# Patient Record
Sex: FEMALE | Race: WHITE | Employment: PART TIME | URBAN - METROPOLITAN AREA
[De-identification: names, ages, dates, MRNs, and addresses within clinical notes are randomized per-mention and may not be internally consistent; named-entity substitution may affect disease eponyms.]

---
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
## 2019-01-24 ENCOUNTER — HOSPITAL ENCOUNTER (EMERGENCY)
Age: 19
Discharge: HOME | End: 2019-01-24
Attending: EMERGENCY MEDICINE | Admitting: EMERGENCY MEDICINE
Payer: COMMERCIAL

## 2019-01-24 ENCOUNTER — APPOINTMENT (OUTPATIENT)
Dept: GENERAL RADIOLOGY | Age: 19
End: 2019-01-24
Attending: EMERGENCY MEDICINE
Payer: COMMERCIAL

## 2019-01-24 VITALS
SYSTOLIC BLOOD PRESSURE: 90 MMHG | BODY MASS INDEX: 17.04 KG/M2 | DIASTOLIC BLOOD PRESSURE: 68 MMHG | HEART RATE: 55 BPM | TEMPERATURE: 98.2 F | OXYGEN SATURATION: 98 % | RESPIRATION RATE: 16 BRPM | HEIGHT: 66 IN | WEIGHT: 106 LBS

## 2019-01-24 DIAGNOSIS — M79.644 FINGER PAIN, RIGHT: Primary | ICD-10-CM

## 2019-01-24 PROCEDURE — 99282 EMERGENCY DEPT VISIT SF MDM: CPT | Performed by: EMERGENCY MEDICINE

## 2019-01-24 PROCEDURE — 73140 X-RAY EXAM OF FINGER(S): CPT

## 2019-01-24 NOTE — LETTER
[facility] [facility] EMERGENCY DEPT [address] [address] 
[phone] Work/School Note Date: 1/24/2019 To Whom It May concern: 
 
Colin Hurtado was seen and treated today in the emergency room by the following provider(s): 
Attending Provider: Gonzalo Good MD. Colin Hurtado may return to work on 1/25/19. Sincerely, 
 
 
 
[address]  [address]

## 2019-01-25 NOTE — ED NOTES
I have reviewed discharge instructions with the patient. The patient verbalized understanding. Patient left ED via Discharge Method: ambulatory to Home with self. Opportunity for questions and clarification provided. Patient given 0 scripts. To continue your aftercare when you leave the hospital, you may receive an automated call from our care team to check in on how you are doing. This is a free service and part of our promise to provide the best care and service to meet your aftercare needs.  If you have questions, or wish to unsubscribe from this service please call [phone]. Thank you for Choosing our [facility] Emergency Department.
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
## 2019-01-25 NOTE — DISCHARGE INSTRUCTIONS
As we discussed, follow up with your primary care provider for repeat evaluation in 3-5 days or return with any worsening pain or swelling or any further concerns.

## 2019-08-20 ENCOUNTER — HOSPITAL ENCOUNTER (EMERGENCY)
Dept: HOSPITAL 57 - BURERS | Age: 19
Discharge: HOME | End: 2019-08-20
Payer: SELF-PAY

## 2019-08-20 DIAGNOSIS — S50.811A: ICD-10-CM
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
## 2019-08-20 DIAGNOSIS — S60.221A: ICD-10-CM

## 2019-08-20 DIAGNOSIS — F17.210: ICD-10-CM

## 2019-08-20 DIAGNOSIS — S29.012A: Primary | ICD-10-CM

## 2019-08-20 DIAGNOSIS — S60.021A: ICD-10-CM

## 2019-08-20 PROCEDURE — 90471 IMMUNIZATION ADMIN: CPT

## 2019-08-20 PROCEDURE — 71046 X-RAY EXAM CHEST 2 VIEWS: CPT

## 2019-08-20 PROCEDURE — 90715 TDAP VACCINE 7 YRS/> IM: CPT

## 2019-08-20 NOTE — RAD
CHEST 2 VIEWS:

 

DATE: 8/20/2019.

 

FINDINGS: 

The heart is normal in size.  There is no mediastinal widening or shift.  The lungs are fully inflate
d and clear.  No rib fractures were seen.

 

IMPRESSION: 

No acute thoracic findings.

 

POS: HOME

## 2019-08-20 NOTE — RAD
RIGHT HAND 3 VIEWS:

 

DATE: 8/20/2019.

 

FINDINGS: 

No fracture was seen.  All bones appear intact.  The carpal relationships appear normal.

 

IMPRESSION: 

No acute findings.

 

POS: HOME

## 2020-03-08 ENCOUNTER — HOSPITAL ENCOUNTER (EMERGENCY)
Age: 20
Discharge: STILL ADMITTED OR ADMINISTRATIVE | End: 2020-03-08

## 2020-09-17 ENCOUNTER — HOSPITAL ENCOUNTER (EMERGENCY)
Age: 20
Discharge: HOME | End: 2020-09-17
Payer: COMMERCIAL

## 2020-09-17 VITALS
BODY MASS INDEX: 17.68 KG/M2 | HEART RATE: 68 BPM | DIASTOLIC BLOOD PRESSURE: 63 MMHG | RESPIRATION RATE: 16 BRPM | OXYGEN SATURATION: 98 % | WEIGHT: 110 LBS | TEMPERATURE: 97 F | HEIGHT: 66 IN | SYSTOLIC BLOOD PRESSURE: 102 MMHG

## 2020-09-17 DIAGNOSIS — L02.91 ABSCESS: ICD-10-CM

## 2020-09-17 DIAGNOSIS — L72.3 SEBACEOUS CYST: Primary | ICD-10-CM

## 2020-09-17 PROCEDURE — 99283 EMERGENCY DEPT VISIT LOW MDM: CPT

## 2020-09-17 PROCEDURE — 75810000289 HC I&D ABSCESS SIMP/COMP/MULT

## 2020-09-17 RX ORDER — CEPHALEXIN 500 MG/1
500 CAPSULE ORAL 4 TIMES DAILY
Qty: 28 CAP | Refills: 0 | Status: SHIPPED | OUTPATIENT
Start: 2020-09-17 | End: 2020-09-24

## 2020-09-17 RX ORDER — SULFAMETHOXAZOLE AND TRIMETHOPRIM 800; 160 MG/1; MG/1
1 TABLET ORAL 2 TIMES DAILY
Qty: 14 TAB | Refills: 0 | Status: SHIPPED | OUTPATIENT
Start: 2020-09-17 | End: 2020-09-24

## 2020-09-17 NOTE — DISCHARGE INSTRUCTIONS

## 2020-09-17 NOTE — ED NOTES
I have reviewed discharge instructions with the patient. The patient verbalized understanding. Patient left ED via Discharge Method: ambulatory to Home with her boyfriend. Opportunity for questions and clarification provided. Patient given 2 scripts. To continue your aftercare when you leave the hospital, you may receive an automated call from our care team to check in on how you are doing.  This is a free service and part of our promise to provide the best care and service to meet your aftercare needs. \" If you have questions, or wish to unsubscribe from this service please call [phone].  Thank you for Choosing our [facility] Emergency Department.

## 2020-09-17 NOTE — ED PROVIDER NOTES
50-year-old female abscess to right side trying to express pus for several days without success. Past Medical History:   Diagnosis Date    Anxiety     Bipolar 1 disorder ([facility] [address]     Depression        No past surgical history on file. No family history on file. Social History     Socioeconomic History    Marital status: SINGLE     Spouse name: Not on file    Number of children: Not on file    Years of education: Not on file    Highest education level: Not on file   Occupational History    Not on file   Social Needs    Financial resource strain: Not on file    Food insecurity     Worry: Not on file     Inability: Not on file    Transportation needs     Medical: Not on file     Non-medical: Not on file   Tobacco Use    Smoking status: Current Every Day Smoker     Packs/day: 0.25    Smokeless tobacco: Never Used   Substance and Sexual Activity    Alcohol use: No    Drug use: No    Sexual activity: Not Currently   Lifestyle    Physical activity     Days per week: Not on file     Minutes per session: Not on file    Stress: Not on file   Relationships    Social connections     Talks on phone: Not on file     Gets together: Not on file     Attends Hoahaoism service: Not on file     Active member of club or organization: Not on file     Attends meetings of clubs or organizations: Not on file     Relationship status: Not on file    Intimate partner violence     Fear of current or ex partner: Not on file     Emotionally abused: Not on file     Physically abused: Not on file     Forced sexual activity: Not on file   Other Topics Concern    Not on file   Social History Narrative    Not on file         ALLERGIES: Patient has no known allergies. Review of Systems   Constitutional: Negative. Negative for activity change. HENT: Negative. Eyes: Negative. Respiratory: Negative. Cardiovascular: Negative. Gastrointestinal: Negative. Genitourinary: Negative.     Musculoskeletal: Negative. Skin: Negative. Neurological: Negative. Psychiatric/Behavioral: Negative. All other systems reviewed and are negative. Vitals:    09/17/20 0118   BP: 104/68   Pulse: 95   Resp: 16   Temp: 97 °F (36.1 °C)   SpO2: 98%   Weight: 49.9 kg (110 lb)   Height: 5' 6\" (1.676 m)            Physical Exam  Vitals signs and nursing note reviewed. Constitutional:       General: She is not in acute distress. Appearance: She is well-developed. She is not diaphoretic. HENT:      Head: Normocephalic and atraumatic. Right Ear: External ear normal.      Left Ear: External ear normal.      Nose: Nose normal.      Mouth/Throat:      Pharynx: No oropharyngeal exudate. Eyes:      General: No scleral icterus. Right eye: No discharge. Left eye: No discharge. Conjunctiva/sclera: Conjunctivae normal.      Pupils: Pupils are equal, round, and reactive to light. Neck:      Musculoskeletal: Normal range of motion and neck supple. Vascular: No JVD. Trachea: No tracheal deviation. Cardiovascular:      Rate and Rhythm: Normal rate. Pulmonary:      Effort: Pulmonary effort is normal. No respiratory distress. Breath sounds: No stridor. No wheezing. Chest:      Chest wall: No tenderness. Abdominal:      General: Bowel sounds are normal. There is no distension. Palpations: Abdomen is soft. There is no mass. Tenderness: There is no abdominal tenderness. Musculoskeletal: Normal range of motion. General: No tenderness. Skin:     General: Skin is warm and dry. Coloration: Skin is not pale. Findings: Abscess present. No erythema or rash. Neurological:      Mental Status: She is alert and oriented to person, place, and time. Cranial Nerves: No cranial nerve deficit. Psychiatric:         Behavior: Behavior normal.         Thought Content:  Thought content normal.          MDM  Number of Diagnoses or Management Options  Abscess: minor  Sebaceous cyst: minor         I&D Abcess Simple    Date/Time: 9/17/2020 2:11 AM  Performed by: Du Cunningham MD  Authorized by: Du Cunningham MD     Consent:     Consent obtained:  Verbal    Consent given by:  Patient    Risks discussed:  Bleeding and damage to other organs    Alternatives discussed:  No treatment  Location:     Type:  Abscess    Location:  Trunk  Pre-procedure details:     Skin preparation:  Betadine  Anesthesia (see MAR for exact dosages): Anesthesia method:  Local infiltration    Local anesthetic:  Lidocaine 1% w/o epi  Procedure type:     Complexity:  Simple  Procedure details:     Needle aspiration: no      Incision types:  Stab incision    Scalpel blade:  11    Wound management:  Probed and deloculated    Drainage:  Purulent and bloody    Drainage amount:  Scant    Packing materials:  None  Post-procedure details:     Patient tolerance of procedure:   Tolerated well, no immediate complications

## 2020-09-17 NOTE — ED TRIAGE NOTES
Patient states she has a bump on her side. States that she has been trying to pop it. States it has been there for 4-5 days.

## 2021-02-04 ENCOUNTER — APPOINTMENT (OUTPATIENT)
Dept: ULTRASOUND IMAGING | Age: 21
End: 2021-02-04
Payer: COMMERCIAL

## 2021-02-04 ENCOUNTER — APPOINTMENT (OUTPATIENT)
Dept: CT IMAGING | Age: 21
End: 2021-02-04
Payer: COMMERCIAL

## 2021-02-04 ENCOUNTER — HOSPITAL ENCOUNTER (EMERGENCY)
Age: 21
Discharge: HOME | End: 2021-02-04
Payer: COMMERCIAL

## 2021-02-04 VITALS
OXYGEN SATURATION: 98 % | SYSTOLIC BLOOD PRESSURE: 106 MMHG | WEIGHT: 126 LBS | RESPIRATION RATE: 16 BRPM | DIASTOLIC BLOOD PRESSURE: 66 MMHG | BODY MASS INDEX: 20.25 KG/M2 | HEIGHT: 66 IN | HEART RATE: 59 BPM | TEMPERATURE: 97.7 F

## 2021-02-04 DIAGNOSIS — N20.1 CALCULUS OF URETER: Primary | ICD-10-CM

## 2021-02-04 LAB
ALBUMIN SERPL-MCNC: 3.8 G/DL (ref 3.5–5)
ALBUMIN/GLOB SERPL: 1.2 {RATIO} (ref 1.2–3.5)
ALP SERPL-CCNC: 64 U/L (ref 50–136)
ALT SERPL-CCNC: 27 U/L (ref 12–65)
ANION GAP SERPL CALC-SCNC: 6 MMOL/L (ref 7–16)
AST SERPL-CCNC: 18 U/L (ref 15–37)
BASOPHILS # BLD: 0.1 K/UL (ref 0–0.2)
BASOPHILS NFR BLD: 1 % (ref 0–2)
BILIRUB SERPL-MCNC: 0.3 MG/DL (ref 0.2–1.1)
BUN SERPL-MCNC: 18 MG/DL (ref 6–23)
CALCIUM SERPL-MCNC: 9.1 MG/DL (ref 8.3–10.4)
CHLORIDE SERPL-SCNC: 110 MMOL/L (ref 98–107)
CO2 SERPL-SCNC: 28 MMOL/L (ref 21–32)
CREAT SERPL-MCNC: 0.73 MG/DL (ref 0.6–1)
DIFFERENTIAL METHOD BLD: ABNORMAL
EOSINOPHIL # BLD: 0.2 K/UL (ref 0–0.8)
EOSINOPHIL NFR BLD: 3 % (ref 0.5–7.8)
ERYTHROCYTE [DISTWIDTH] IN BLOOD BY AUTOMATED COUNT: 12.4 % (ref 11.9–14.6)
GLOBULIN SER CALC-MCNC: 3.3 G/DL (ref 2.3–3.5)
GLUCOSE SERPL-MCNC: 107 MG/DL (ref 65–100)
HCT VFR BLD AUTO: 42.1 % (ref 35.8–46.3)
HGB BLD-MCNC: 14 G/DL (ref 11.7–15.4)
IMM GRANULOCYTES # BLD AUTO: 0 K/UL (ref 0–0.5)
IMM GRANULOCYTES NFR BLD AUTO: 0 % (ref 0–5)
LIPASE SERPL-CCNC: 152 U/L (ref 73–393)
LYMPHOCYTES # BLD: 4.1 K/UL (ref 0.5–4.6)
LYMPHOCYTES NFR BLD: 48 % (ref 13–44)
MCH RBC QN AUTO: 33.1 PG (ref 26.1–32.9)
MCHC RBC AUTO-ENTMCNC: 33.3 G/DL (ref 31.4–35)
MCV RBC AUTO: 99.5 FL (ref 79.6–97.8)
MONOCYTES # BLD: 0.5 K/UL (ref 0.1–1.3)
MONOCYTES NFR BLD: 6 % (ref 4–12)
NEUTS SEG # BLD: 3.6 K/UL (ref 1.7–8.2)
NEUTS SEG NFR BLD: 42 % (ref 43–78)
NRBC # BLD: 0 K/UL (ref 0–0.2)
PLATELET # BLD AUTO: 152 K/UL (ref 150–450)
PMV BLD AUTO: 13.5 FL (ref 9.4–12.3)
POTASSIUM SERPL-SCNC: 3.6 MMOL/L (ref 3.5–5.1)
PROT SERPL-MCNC: 7.1 G/DL (ref 6.3–8.2)
RBC # BLD AUTO: 4.23 M/UL (ref 4.05–5.2)
SODIUM SERPL-SCNC: 144 MMOL/L (ref 136–145)
WBC # BLD AUTO: 8.6 K/UL (ref 4.3–11.1)

## 2021-02-04 PROCEDURE — 96374 THER/PROPH/DIAG INJ IV PUSH: CPT

## 2021-02-04 PROCEDURE — 99283 EMERGENCY DEPT VISIT LOW MDM: CPT

## 2021-02-04 PROCEDURE — 80053 COMPREHEN METABOLIC PANEL: CPT

## 2021-02-04 PROCEDURE — 76770 US EXAM ABDO BACK WALL COMP: CPT

## 2021-02-04 PROCEDURE — 83690 ASSAY OF LIPASE: CPT

## 2021-02-04 PROCEDURE — 96375 TX/PRO/DX INJ NEW DRUG ADDON: CPT

## 2021-02-04 PROCEDURE — 85025 COMPLETE CBC W/AUTO DIFF WBC: CPT

## 2021-02-04 PROCEDURE — 74011250636 HC RX REV CODE- 250/636

## 2021-02-04 PROCEDURE — 74176 CT ABD & PELVIS W/O CONTRAST: CPT

## 2021-02-04 RX ORDER — SODIUM CHLORIDE 9 MG/ML
1000 INJECTION, SOLUTION INTRAVENOUS ONCE
Status: COMPLETED | OUTPATIENT
Start: 2021-02-04 | End: 2021-02-04

## 2021-02-04 RX ORDER — HYDROMORPHONE HYDROCHLORIDE 1 MG/ML
1 INJECTION, SOLUTION INTRAMUSCULAR; INTRAVENOUS; SUBCUTANEOUS ONCE
Status: COMPLETED | OUTPATIENT
Start: 2021-02-04 | End: 2021-02-04

## 2021-02-04 RX ORDER — MORPHINE SULFATE 4 MG/ML
4 INJECTION INTRAVENOUS
Status: COMPLETED | OUTPATIENT
Start: 2021-02-04 | End: 2021-02-04

## 2021-02-04 RX ORDER — KETOROLAC TROMETHAMINE 30 MG/ML
30 INJECTION, SOLUTION INTRAMUSCULAR; INTRAVENOUS
Status: COMPLETED | OUTPATIENT
Start: 2021-02-04 | End: 2021-02-04

## 2021-02-04 RX ORDER — HYDROCODONE BITARTRATE AND ACETAMINOPHEN 7.5; 325 MG/1; MG/1
1 TABLET ORAL 2 TIMES DAILY
Qty: 10 TAB | Refills: 0 | Status: SHIPPED | OUTPATIENT
Start: 2021-02-04 | End: 2021-02-07

## 2021-02-04 RX ORDER — ONDANSETRON 2 MG/ML
4 INJECTION INTRAMUSCULAR; INTRAVENOUS
Status: COMPLETED | OUTPATIENT
Start: 2021-02-04 | End: 2021-02-04

## 2021-02-04 RX ORDER — HYDROCODONE BITARTRATE AND ACETAMINOPHEN 7.5; 325 MG/1; MG/1
1 TABLET ORAL 2 TIMES DAILY
Qty: 60 TAB | Refills: 0 | Status: SHIPPED | OUTPATIENT
Start: 2021-02-04 | End: 2021-02-04 | Stop reason: SDUPTHER

## 2021-02-04 RX ORDER — TAMSULOSIN HYDROCHLORIDE 0.4 MG/1
0.4 CAPSULE ORAL DAILY
Qty: 15 CAP | Refills: 0 | Status: SHIPPED | OUTPATIENT
Start: 2021-02-04 | End: 2021-02-10 | Stop reason: SDUPTHER

## 2021-02-04 RX ADMIN — SODIUM CHLORIDE 1000 ML: 900 INJECTION, SOLUTION INTRAVENOUS at 08:39

## 2021-02-04 RX ADMIN — ONDANSETRON 4 MG: 2 INJECTION INTRAMUSCULAR; INTRAVENOUS at 08:39

## 2021-02-04 RX ADMIN — MORPHINE SULFATE 4 MG: 4 INJECTION INTRAVENOUS at 08:39

## 2021-02-04 RX ADMIN — KETOROLAC TROMETHAMINE 30 MG: 30 INJECTION, SOLUTION INTRAMUSCULAR at 08:39

## 2021-02-04 RX ADMIN — HYDROMORPHONE HYDROCHLORIDE 1 MG: 1 INJECTION, SOLUTION INTRAMUSCULAR; INTRAVENOUS; SUBCUTANEOUS at 10:38

## 2021-02-04 NOTE — LETTER
[facility] EMERGENCY DEPT 
[address] [facility] [place] [facility] 
[phone] Work/School Note Date: 2/4/2021 To Whom It May concern: 
 
 
Shon Leon was seen and treated today in the emergency room by the following provider(s): 
Attending Provider: Romi Guzman MD. Shon Leon is excused from work/school on 02/04/21. She is clear to return to work/school on 02/05/21. Sincerely, 
 
 
Scout Gibbons y

## 2021-02-04 NOTE — ED PROVIDER NOTES
70-year-old female complaint of left flank and abdominal pain. Patient is very dramatic in triage having severe pain. Flank Pain   This is a new problem. The current episode started 6 to 12 hours ago. The problem has not changed since onset. The problem occurs constantly. The pain is associated with no known injury. The quality of the pain is described as stabbing. The pain is at a severity of 10/10. Associated symptoms include abdominal pain. Pertinent negatives include no chest pain, no fever, no bladder incontinence, no dysuria, no pelvic pain, no paresthesias, no paresis and no tingling. She has tried nothing for the symptoms. Past Medical History:   Diagnosis Date    Anxiety     Bipolar 1 disorder ([facility] [place] [address]     Depression        No past surgical history on file. History reviewed. No pertinent family history.     Social History     Socioeconomic History    Marital status: SINGLE     Spouse name: Not on file    Number of children: Not on file    Years of education: Not on file    Highest education level: Not on file   Occupational History    Not on file   Social Needs    Financial resource strain: Not on file    Food insecurity     Worry: Not on file     Inability: Not on file    Transportation needs     Medical: Not on file     Non-medical: Not on file   Tobacco Use    Smoking status: Current Every Day Smoker     Packs/day: 0.25    Smokeless tobacco: Never Used   Substance and Sexual Activity    Alcohol use: No    Drug use: No    Sexual activity: Not Currently   Lifestyle    Physical activity     Days per week: Not on file     Minutes per session: Not on file    Stress: Not on file   Relationships    Social connections     Talks on phone: Not on file     Gets together: Not on file     Attends Church service: Not on file     Active member of club or organization: Not on file     Attends meetings of clubs or organizations: Not on file     Relationship status: Not on file   [facility] Intimate partner violence     Fear of current or ex partner: Not on file     Emotionally abused: Not on file     Physically abused: Not on file     Forced sexual activity: Not on file   Other Topics Concern    Not on file   Social History Narrative    Not on file         ALLERGIES: Patient has no known allergies. Review of Systems   Constitutional: Negative. Negative for activity change and fever. HENT: Negative. Eyes: Negative. Respiratory: Negative. Cardiovascular: Negative. Negative for chest pain. Gastrointestinal: Positive for abdominal pain. Genitourinary: Positive for flank pain. Negative for bladder incontinence, dysuria and pelvic pain. Skin: Negative. Neurological: Negative. Negative for tingling and paresthesias. Psychiatric/Behavioral: Negative. All other systems reviewed and are negative. Vitals:    02/04/21 0828 02/04/21 1035   BP: 131/62 129/64   Pulse: (!) 59 64   Resp: 16    Temp: 97.7 °F (36.5 °C)    SpO2: 100% 100%   Weight: 57.2 kg (126 lb)    Height: 5' 6\" (1.676 m)             Physical Exam  Vitals signs and nursing note reviewed. Constitutional:       General: She is not in acute distress. Appearance: Normal appearance. She is well-developed. She is not ill-appearing, toxic-appearing or diaphoretic. HENT:      Head: Normocephalic and atraumatic. No right periorbital erythema or left periorbital erythema. Jaw: There is normal jaw occlusion. Salivary Glands: Right salivary gland is not diffusely enlarged. Left salivary gland is not diffusely enlarged. Right Ear: External ear normal.      Left Ear: External ear normal.      Nose: Nose normal. No congestion or rhinorrhea. Mouth/Throat:      Mouth: Mucous membranes are moist.      Pharynx: No oropharyngeal exudate or posterior oropharyngeal erythema. Eyes:      General: Lids are normal. No scleral icterus. Right eye: No discharge. Left eye: No discharge. Extraocular Movements: Extraocular movements intact. Conjunctiva/sclera: Conjunctivae normal.      Right eye: Right conjunctiva is not injected. Left eye: Left conjunctiva is not injected. Pupils: Pupils are equal, round, and reactive to light. Neck:      Musculoskeletal: Full passive range of motion without pain, normal range of motion and neck supple. Normal range of motion. No erythema, neck rigidity, injury, pain with movement or muscular tenderness. Thyroid: No thyroid mass. Vascular: No JVD. Trachea: Trachea and phonation normal.   Cardiovascular:      Rate and Rhythm: Normal rate and regular rhythm. Pulses: Normal pulses. Heart sounds: Normal heart sounds. Heart sounds not distant. No murmur. No friction rub. No gallop. Pulmonary:      Effort: Pulmonary effort is normal. No tachypnea, accessory muscle usage, respiratory distress or retractions. Breath sounds: No stridor. No decreased breath sounds, wheezing, rhonchi or rales. Chest:      Chest wall: No tenderness. Abdominal:      General: Abdomen is flat. Bowel sounds are normal. There is no distension. There are no signs of injury. Palpations: Abdomen is soft. There is no fluid wave, mass or pulsatile mass. Tenderness: There is no abdominal tenderness. There is no guarding or rebound. Musculoskeletal: Normal range of motion. General: No swelling, tenderness, deformity or signs of injury. Right lower leg: No edema. Left lower leg: No edema. Lymphadenopathy:      Cervical: No cervical adenopathy. Skin:     General: Skin is warm and dry. Capillary Refill: Capillary refill takes less than 2 seconds. Coloration: Skin is not jaundiced or pale. Findings: No bruising, erythema or rash. Neurological:      General: No focal deficit present. Mental Status: She is alert and oriented to person, place, and time. Mental status is at baseline.       GCS: GCS eye subscore is 4. GCS verbal subscore is 5. GCS motor subscore is 6. Cranial Nerves: No dysarthria or facial asymmetry. Sensory: No sensory deficit. Motor: No weakness or tremor. Coordination: Coordination normal.   Psychiatric:         Mood and Affect: Mood normal.         Behavior: Behavior normal. Behavior is cooperative. Thought Content: Thought content normal.         Judgment: Judgment normal.          MDM  Number of Diagnoses or Management Options  Calculus of ureter  Diagnosis management comments: Patient is demonstrating a lot of pain complaints requiring multiple doses of narcotic pain medicine this is her first stone so the pain is somewhat surprising to her. We will give her pain medicine today Flomax and have her follow-up with urology she has a 2 mm obstructing stone.        Amount and/or Complexity of Data Reviewed  Clinical lab tests: ordered and reviewed  Tests in the radiology section of CPT®: ordered and reviewed  Tests in the medicine section of CPT®: ordered and reviewed  Decide to obtain previous medical records or to obtain history from someone other than the patient: yes  Review and summarize past medical records: yes  Independent visualization of images, tracings, or specimens: yes    Risk of Complications, Morbidity, and/or Mortality  Presenting problems: high  Diagnostic procedures: high  Management options: high    Patient Progress  Patient progress: stable         Procedures

## 2021-02-04 NOTE — ED TRIAGE NOTES
Pt arrives ambulatory to triage. Reports she took her tampon out today and started having excruciating pain in left flank area that radiates to the left side of abdomen. States nausea and vomiting with this. in obvious pain in triage. Masked.

## 2021-02-04 NOTE — ED NOTES
US called this RN to make aware that Bladder was not full enough for pelvic exam and then pt had increased pain and was not able to tolerate imaging. MD aware.  Pt brought back

## 2021-02-04 NOTE — ED NOTES
I have reviewed discharge instructions with the patient. The patient verbalized understanding. Patient left ED via Discharge Method: ambulatory to Home with family    Opportunity for questions and clarification provided. Patient given 2 scripts. To continue your aftercare when you leave the hospital, you may receive an automated call from our care team to check in on how you are doing. This is a free service and part of our promise to provide the best care and service to meet your aftercare needs.  If you have questions, or wish to unsubscribe from this service please call [phone]. Thank you for Choosing our [facility] Emergency Department.

## 2023-01-01 ENCOUNTER — HOSPITAL ENCOUNTER (EMERGENCY)
Age: 23
Discharge: HOME | End: 2023-01-01
Attending: EMERGENCY MEDICINE
Payer: MEDICAID

## 2023-01-01 VITALS
TEMPERATURE: 98.6 F | BODY MASS INDEX: 21.19 KG/M2 | OXYGEN SATURATION: 98 % | DIASTOLIC BLOOD PRESSURE: 60 MMHG | SYSTOLIC BLOOD PRESSURE: 101 MMHG | HEIGHT: 67 IN | HEART RATE: 72 BPM | RESPIRATION RATE: 17 BRPM | WEIGHT: 135 LBS

## 2023-01-01 DIAGNOSIS — J02.9 ACUTE PHARYNGITIS, UNSPECIFIED ETIOLOGY: Primary | ICD-10-CM

## 2023-01-01 LAB — STREP, MOLECULAR: NOT DETECTED

## 2023-01-01 PROCEDURE — 6370000000 HC RX 637 (ALT 250 FOR IP): Performed by: STUDENT IN AN ORGANIZED HEALTH CARE EDUCATION/TRAINING PROGRAM

## 2023-01-01 PROCEDURE — 96372 THER/PROPH/DIAG INJ SC/IM: CPT

## 2023-01-01 PROCEDURE — 6360000002 HC RX W HCPCS: Performed by: STUDENT IN AN ORGANIZED HEALTH CARE EDUCATION/TRAINING PROGRAM

## 2023-01-01 PROCEDURE — 99284 EMERGENCY DEPT VISIT MOD MDM: CPT

## 2023-01-01 PROCEDURE — 87651 STREP A DNA AMP PROBE: CPT

## 2023-01-01 RX ORDER — LIDOCAINE HYDROCHLORIDE 20 MG/ML
15 SOLUTION OROPHARYNGEAL
Status: COMPLETED | OUTPATIENT
Start: 2023-01-01 | End: 2023-01-01

## 2023-01-01 RX ORDER — DEXAMETHASONE SODIUM PHOSPHATE 10 MG/ML
10 INJECTION, SOLUTION INTRAMUSCULAR; INTRAVENOUS ONCE
Status: COMPLETED | OUTPATIENT
Start: 2023-01-01 | End: 2023-01-01

## 2023-01-01 RX ORDER — AMOXICILLIN 500 MG/1
500 CAPSULE ORAL 2 TIMES DAILY
Qty: 20 CAPSULE | Refills: 0 | Status: SHIPPED | OUTPATIENT
Start: 2023-01-01 | End: 2023-01-11

## 2023-01-01 RX ORDER — LIDOCAINE HYDROCHLORIDE 20 MG/ML
15 SOLUTION OROPHARYNGEAL
Qty: 100 ML | Refills: 0 | Status: SHIPPED | OUTPATIENT
Start: 2023-01-01 | End: 2023-01-08

## 2023-01-01 RX ORDER — PREDNISONE 50 MG/1
50 TABLET ORAL DAILY
Qty: 5 TABLET | Refills: 0 | Status: SHIPPED | OUTPATIENT
Start: 2023-01-01 | End: 2023-01-06

## 2023-01-01 RX ADMIN — LIDOCAINE HYDROCHLORIDE 15 ML: 20 SOLUTION ORAL; TOPICAL at 21:44

## 2023-01-01 RX ADMIN — DEXAMETHASONE SODIUM PHOSPHATE 10 MG: 10 INJECTION INTRAMUSCULAR; INTRAVENOUS at 21:44

## 2023-01-01 ASSESSMENT — PAIN DESCRIPTION - LOCATION
LOCATION: EAR;THROAT
LOCATION: THROAT

## 2023-01-01 ASSESSMENT — ENCOUNTER SYMPTOMS
VOICE CHANGE: 0
SHORTNESS OF BREATH: 0
WHEEZING: 0
VOMITING: 0
RHINORRHEA: 0
SORE THROAT: 1
FACIAL SWELLING: 0
EYE PAIN: 0
COUGH: 0
EYE REDNESS: 0
EYE DISCHARGE: 0
DIARRHEA: 0
APNEA: 0
ABDOMINAL PAIN: 0
TROUBLE SWALLOWING: 0
CHEST TIGHTNESS: 0
PHOTOPHOBIA: 0

## 2023-01-01 ASSESSMENT — PAIN DESCRIPTION - DESCRIPTORS: DESCRIPTORS: ACHING;SHARP

## 2023-01-01 ASSESSMENT — PAIN SCALES - GENERAL
PAINLEVEL_OUTOF10: 8
PAINLEVEL_OUTOF10: 4

## 2023-01-01 ASSESSMENT — PAIN - FUNCTIONAL ASSESSMENT
PAIN_FUNCTIONAL_ASSESSMENT: 0-10
PAIN_FUNCTIONAL_ASSESSMENT: ACTIVITIES ARE NOT PREVENTED

## 2023-01-01 ASSESSMENT — LIFESTYLE VARIABLES
HOW MANY STANDARD DRINKS CONTAINING ALCOHOL DO YOU HAVE ON A TYPICAL DAY: 1 OR 2
HOW OFTEN DO YOU HAVE A DRINK CONTAINING ALCOHOL: MONTHLY OR LESS

## 2023-01-01 NOTE — Clinical Note
Komal Reyes was seen and treated in our emergency department on 1/1/2023. She may return to work on 01/03/2023. If you have any questions or concerns, please don't hesitate to call.       Nj Pino

## 2023-01-02 NOTE — ED PROVIDER NOTES
Emergency Department Provider Note                   PCP:                None Provider               Age: 20 y.o. Sex: female       ICD-10-CM    1. Acute pharyngitis, unspecified etiology  J02.9           DISPOSITION Discharge - Pending Orders Complete 01/01/2023 09:28:37 PM        MDM  Number of Diagnoses or Management Options  Acute pharyngitis, unspecified etiology  Diagnosis management comments: In summary this is a 79-year-old female patient presenting with symptoms of acute pharyngitis. Based on my evaluation I feel the patient is at low risk for alternative causes such as peritonsillar abscess, retropharyngeal abscess, epiglottitis, Ludwigs angina. The reasoning behind my decision process is that the patient is overall well-appearing in the exam room, not tripoding, and in no acute distress. Vital signs are stable without evidence of hypoxia, tachycardia, tachypnea, hypotension. Slightly low normal blood pressure adequately explained by patient's small frame. The patient does not exhibit any muffled voice, uvular deviation, trismus. The patient has good dentition, is not toxic appearing, and is not drooling. The patient is speaking in full sentences without increased work of breathing. There is no evidence of submandibular swelling or edema on the floor of the mouth. Lungs are clear to auscultation and the patient does not exhibit any stridor. Rapid strep is negative. However, given patient's 7-day course of symptoms, we will treat empirically for other bacterial infection. The plan for this patient is outpatient management. We'll treat the patient with oral antibiotics. The patient was instructed of signs and symptoms to monitor for and instructed to encourage supportive care. The follow up for this patient will be on an as needed basis if symptoms worsen, persist, change.  I have specifically counseled the patient on warning signs to return immediately for including but not limited to Please renew if applicable.     Anum Aquino LPN............. December 19, 2019 11:14 AM      difficulty swallowing, respiratory difficulty. The patient has verbalized understanding and is in agreement with the treatment plan. Amount and/or Complexity of Data Reviewed  Clinical lab tests: ordered and reviewed    Risk of Complications, Morbidity, and/or Mortality  Presenting problems: low  Diagnostic procedures: low  Management options: low       Complexity of Problem: 1 stable, acute illness. (3)  The patients assessment required an independent historian: I spoke with a parent. I have conducted an independent ordering and review of Labs. Considerations: Shared decision making was utilized in the care of this patient. Social determinant affecting care: inability to afford medications  Evidence based risk calculation performed: Centor criteria  We discussed care recommended by provider that patient declined (tests, disposition, etc). Patient was discharged risks and benefits of hospitalization were discussed. Orders Placed This Encounter   Procedures    Group A Strep Screen By PCR        Medications   dexamethasone (PF) (DECADRON) injection 10 mg (has no administration in time range)   lidocaine viscous hcl (XYLOCAINE) 2 % solution 15 mL (has no administration in time range)       New Prescriptions    AMOXICILLIN (AMOXIL) 500 MG CAPSULE    Take 1 capsule by mouth 2 times daily for 10 days    LIDOCAINE VISCOUS HCL (XYLOCAINE) 2 % SOLN SOLUTION    Take 15 mLs by mouth every 3 hours as needed for Irritation (sore throat)    PREDNISONE (DELTASONE) 50 MG TABLET    Take 1 tablet by mouth daily for 5 days        Julien Sterling is a 20 y.o. female who presents to the Emergency Department with chief complaint of    Chief Complaint   Patient presents with    Pharyngitis    Otalgia      23-year-old female patient without significant reported past medical history presents today complaining of sore throat for the past 1 week. Reports symptoms as constant and moderate in severity.   Reports symptoms are exacerbated by swallowing. Better with Tylenol. Reports she feels like due to the pain in her throat she is getting some pain in her ears bilaterally as well. Denies fevers, trismus, drooling, voice changes, neck stiffness or swelling, difficulty swallowing. Denies chest pain or shortness of breath. Denies congestion, headaches. Denies ear swelling or mastoid tenderness. Patient is primary historian and quality is reliable. The history is provided by the patient. No  was used. Review of Systems   Constitutional:  Negative for fatigue and fever. HENT:  Positive for ear pain and sore throat. Negative for congestion, facial swelling, hearing loss, rhinorrhea, trouble swallowing and voice change. Eyes:  Negative for photophobia, pain, discharge, redness and visual disturbance. Respiratory:  Negative for apnea, cough, chest tightness, shortness of breath and wheezing. Cardiovascular:  Negative for chest pain and palpitations. Gastrointestinal:  Negative for abdominal pain, diarrhea and vomiting. Endocrine: Negative for polydipsia, polyphagia and polyuria. Genitourinary:  Negative for decreased urine volume, dysuria, flank pain, frequency and hematuria. Musculoskeletal:  Negative for arthralgias, joint swelling, myalgias and neck stiffness. Skin:  Negative for rash and wound. Neurological:  Negative for dizziness, syncope, speech difficulty, weakness, light-headedness and headaches. Hematological:  Does not bruise/bleed easily. Psychiatric/Behavioral:  Negative for self-injury and suicidal ideas. All other systems reviewed and are negative. Past Medical History:   Diagnosis Date    Anxiety     Bipolar 1 disorder ([facility] [address]     Depression         History reviewed. No pertinent surgical history. History reviewed. No pertinent family history.      Social History     Socioeconomic History    Marital status: Single     Spouse name: None    Number of children: None    Years of education: None    Highest education level: None   Tobacco Use    Smoking status: Every Day     Packs/day: 0.25     Types: Cigarettes    Smokeless tobacco: Never   Substance and Sexual Activity    Alcohol use: No    Drug use: No         Patient has no known allergies. Previous Medications    No medications on file        Vitals signs and nursing note reviewed. Patient Vitals for the past 4 hrs:   Temp Pulse Resp BP SpO2   01/01/23 2044 98.6 °F (37 °C) 65 16 (!) 98/56 100 %          Physical Exam  Vitals and nursing note reviewed. Constitutional:       General: She is not in acute distress. Appearance: Normal appearance. She is well-developed and normal weight. She is not ill-appearing, toxic-appearing or diaphoretic. Comments: Overall well-appearing and in no acute distress. No tripoding. No drooling. Normal swallow. HENT:      Head: Normocephalic and atraumatic. Right Ear: Tympanic membrane, ear canal and external ear normal. No drainage, swelling or tenderness. No middle ear effusion. No mastoid tenderness. Tympanic membrane is not erythematous. Left Ear: Tympanic membrane, ear canal and external ear normal. No drainage, swelling or tenderness. No middle ear effusion. No mastoid tenderness. Tympanic membrane is not erythematous. Ears:      Comments: No signs of otitis media/externa. No mastoid tenderness or swelling bilaterally. Nose: Nose normal. No congestion or rhinorrhea. Mouth/Throat:      Lips: Pink. No lesions. Mouth: Mucous membranes are moist. No oral lesions or angioedema. Dentition: No dental abscesses. Tongue: No lesions. Tongue does not deviate from midline. Palate: No mass and lesions. Pharynx: Uvula midline. Posterior oropharyngeal erythema present. No pharyngeal swelling, oropharyngeal exudate or uvula swelling. Tonsils: No tonsillar exudate or tonsillar abscesses. 0 on the right. 0 on the left. Comments: Posterior oropharyngeal erythema present. Uvula midline. No signs of peritonsillar abscess. No submandibular swelling or tenderness. No signs of trismus. Handling secretions well without drooling. No tripoding. Speaks in full sentences. No hot potato voice. Eyes:      General:         Right eye: No discharge. Left eye: No discharge. Extraocular Movements: Extraocular movements intact. Right eye: Normal extraocular motion. Left eye: Normal extraocular motion. Conjunctiva/sclera: Conjunctivae normal.      Pupils: Pupils are equal, round, and reactive to light. Neck:      Meningeal: Brudzinski's sign and Kernig's sign absent. Comments: No meningeal signs. Full ROM of neck without pain. Mild anterior cervical lymphadenopathy  Cardiovascular:      Rate and Rhythm: Normal rate and regular rhythm. Pulses: Normal pulses. Heart sounds: Normal heart sounds. No murmur heard. Pulmonary:      Effort: Pulmonary effort is normal. No respiratory distress. Breath sounds: Normal breath sounds. No wheezing, rhonchi or rales. Abdominal:      General: Abdomen is flat. Bowel sounds are normal.      Palpations: Abdomen is soft. Tenderness: There is no abdominal tenderness. There is no guarding or rebound. Musculoskeletal:      Cervical back: Full passive range of motion without pain, normal range of motion and neck supple. No rigidity or tenderness. Normal range of motion. Lymphadenopathy:      Cervical: No cervical adenopathy. Skin:     General: Skin is warm and dry. Capillary Refill: Capillary refill takes less than 2 seconds. Findings: No erythema or rash. Neurological:      General: No focal deficit present. Mental Status: She is alert and oriented to person, place, and time. Mental status is at baseline.    Psychiatric:         Mood and Affect: Mood normal.         Behavior: Behavior normal.        Procedures    Results for orders placed or performed during the hospital encounter of 01/01/23   Group A Strep Screen By PCR    Specimen: Swab   Result Value Ref Range    Strep, Molecular Not detected          No orders to display                       Voice dictation software was used during the making of this note. This software is not perfect and grammatical and other typographical errors may be present. This note has not been completely proofread for errors.      Too P[place]  01/01/23 6716

## 2023-01-02 NOTE — DISCHARGE INSTRUCTIONS
Your strep test was negative today. We will treat you for presumed bacterial infection with antibiotics. Please take the antibiotics for the full 10 days even if your symptoms start to improve prior to the 10 days being complete. Use viscous lidocaine solution as prescribed to reduce your pain. Take prednisone steroid tablets for the next 5 days to reduce your symptoms. Return here for any new or worsening symptoms. As we discussed, I did not find a life threatening cause of your symptoms today. However, THAT DOES NOT MEAN IT COULD NOT DEVELOP. If you develop ANY new or worsening symptoms, it is critical that you return for re-evaluation. This includes any symptoms that are concerning to you, especially symptoms such as difficulty swallowing, drooling, inability to fully open your mouth, deviation of your uvula to one side, neck swelling. If you do not return for re-evaluation, you risk serious complications, including death.

## 2023-01-02 NOTE — ED NOTES
I have reviewed discharge instructions with the patient. The patient verbalized understanding. Patient left ED via Discharge Method: ambulatory to Home with friend. Opportunity for questions and clarification provided. Patient given 3 scripts. To continue your aftercare when you leave the hospital, you may receive an automated call from our care team to check in on how you are doing. This is a free service and part of our promise to provide the best care and service to meet your aftercare needs.  If you have questions, or wish to unsubscribe from this service please call [phone]. Thank you for Choosing our [facility] Emergency Department.           Pasha Weiner RN  01/01/23 7096

## 2024-07-23 ENCOUNTER — HOSPITAL ENCOUNTER (EMERGENCY)
Age: 24
Discharge: HOME | End: 2024-07-23
Payer: MEDICAID

## 2024-07-23 ENCOUNTER — APPOINTMENT (OUTPATIENT)
Dept: GENERAL RADIOLOGY | Age: 24
End: 2024-07-23
Payer: MEDICAID

## 2024-07-23 VITALS
BODY MASS INDEX: 25.11 KG/M2 | DIASTOLIC BLOOD PRESSURE: 64 MMHG | RESPIRATION RATE: 20 BRPM | TEMPERATURE: 98.4 F | HEART RATE: 68 BPM | SYSTOLIC BLOOD PRESSURE: 117 MMHG | OXYGEN SATURATION: 98 % | WEIGHT: 160 LBS | HEIGHT: 67 IN

## 2024-07-23 DIAGNOSIS — S63.655A SPRAIN OF METACARPOPHALANGEAL (MCP) JOINT OF LEFT RING FINGER, INITIAL ENCOUNTER: ICD-10-CM

## 2024-07-23 DIAGNOSIS — S60.221A CONTUSION OF RIGHT HAND, INITIAL ENCOUNTER: Primary | ICD-10-CM

## 2024-07-23 PROCEDURE — 73140 X-RAY EXAM OF FINGER(S): CPT

## 2024-07-23 PROCEDURE — 73130 X-RAY EXAM OF HAND: CPT

## 2024-07-23 PROCEDURE — 99283 EMERGENCY DEPT VISIT LOW MDM: CPT

## 2024-07-23 RX ORDER — DEXTROAMPHETAMINE SACCHARATE, AMPHETAMINE ASPARTATE MONOHYDRATE, DEXTROAMPHETAMINE SULFATE AND AMPHETAMINE SULFATE 2.5; 2.5; 2.5; 2.5 MG/1; MG/1; MG/1; MG/1
10 CAPSULE, EXTENDED RELEASE ORAL EVERY MORNING
COMMUNITY

## 2024-07-23 RX ORDER — LAMOTRIGINE 100 MG/1
100 TABLET ORAL DAILY
COMMUNITY

## 2024-07-23 NOTE — ED TRIAGE NOTES
States that she punched a wall with her right hand yesterday.  Right hand is bruised and swollen.  Fell yesterday and hyperextended her left 4th finger

## 2024-07-23 NOTE — DISCHARGE INSTRUCTIONS
The x-rays of your hand and finger did not show any broken bones.  Recommend that you wear the finger splint that she purchased from [employer] for the next week to help for comfort purposes.  Also recommend that you rotate ice on and off to your hand and finger and can take over-the-counter Motrin or Tylenol for any pain.  Follow-up with your doctor for any further evaluation.

## 2024-07-23 NOTE — ED PROVIDER NOTES
use: No        Previous Medications    AMPHETAMINE-DEXTROAMPHETAMINE (ADDERALL XR) 10 MG EXTENDED RELEASE CAPSULE    Take 1 capsule by mouth every morning. Max Daily Amount: 10 mg    CARIPRAZINE HCL (VRAYLAR) 1.5 MG CAPSULE    Take 2 capsules by mouth daily    LAMOTRIGINE (LAMICTAL) 100 MG TABLET    Take 1 tablet by mouth daily        Results for orders placed or performed during the hospital encounter of 07/23/24   XR HAND RIGHT (MIN 3 VIEWS)    Narrative    Right hand    INDICATION: injury    COMPARISON:  None    TECHNIQUE: Three views of the right hand were obtained.    FINDINGS: There is no evidence of fracture or dislocation. There are no bony  erosions. There is no significant joint space narrowing.      Impression    Negative right hand      Electronically signed by Cindy Verdin   XR FINGER LEFT (MIN 2 VIEWS)    Narrative    EXAMINATION: XR FINGER LEFT (MIN 2 VIEWS) 7/23/2024 4:48 PM    ACCESSION NUMBER: [id number]    COMPARISON: None available    INDICATION: hyperextended finger    TECHNIQUE: 3 views of the left were obtained.    FINDINGS:  No acute fracture or dislocation. The joint spaces are maintained. The soft  tissues are unremarkable. Bony mineralization is preserved.          Impression    No acute osseous abnormality.            Electronically signed by Cindy Verdin         XR HAND RIGHT (MIN 3 VIEWS)   Final Result   Negative right hand         Electronically signed by Cindy Verdin      XR FINGER LEFT (MIN 2 VIEWS)   Final Result   No acute osseous abnormality.                  Electronically signed by Cindy Verdin                   No results for input(s): \"COVID19\" in the last 72 hours.     Voice dictation software was used during the making of this note.  This software is not perfect and grammatical and other typographical errors may be present.  This note has not been completely proofread for errors.     Karla Au PA  07/23/24 2499